# Patient Record
Sex: FEMALE | Race: WHITE | Employment: OTHER | RURAL
[De-identification: names, ages, dates, MRNs, and addresses within clinical notes are randomized per-mention and may not be internally consistent; named-entity substitution may affect disease eponyms.]

---

## 2018-09-07 PROBLEM — F33.2 MAJOR DEPRESSIVE DISORDER, RECURRENT EPISODE, SEVERE (HCC): Status: ACTIVE | Noted: 2018-09-07

## 2018-09-07 PROBLEM — F33.3 DEPRESSION, MAJOR, RECURRENT, SEVERE WITH PSYCHOSIS (HCC): Status: ACTIVE | Noted: 2018-09-07

## 2018-09-07 PROBLEM — F33.3 MAJOR DEPRESSIVE DISORDER, RECURRENT EPISODE WITH MOOD-CONGRUENT PSYCHOTIC FEATURES (HCC): Status: ACTIVE | Noted: 2018-09-07

## 2018-11-21 PROBLEM — E78.5 HYPERLIPIDEMIA: Chronic | Status: ACTIVE | Noted: 2018-11-21

## 2018-11-21 PROBLEM — E11.9 DIABETES MELLITUS TYPE 2, DIET-CONTROLLED (HCC): Chronic | Status: ACTIVE | Noted: 2018-11-21

## 2018-11-21 PROBLEM — I44.7 COMPLETE LEFT BUNDLE BRANCH BLOCK (LBBB): Chronic | Status: ACTIVE | Noted: 2018-11-21

## 2018-11-21 PROBLEM — C50.411 MALIGNANT NEOPLASM OF UPPER-OUTER QUADRANT OF RIGHT BREAST IN FEMALE, ESTROGEN RECEPTOR NEGATIVE (HCC): Status: ACTIVE | Noted: 2018-11-21

## 2018-11-21 PROBLEM — G20 PARKINSON DISEASE (HCC): Chronic | Status: ACTIVE | Noted: 2018-11-21

## 2018-11-21 PROBLEM — Z17.1 MALIGNANT NEOPLASM OF UPPER-OUTER QUADRANT OF RIGHT BREAST IN FEMALE, ESTROGEN RECEPTOR NEGATIVE (HCC): Status: ACTIVE | Noted: 2018-11-21

## 2018-11-21 PROBLEM — Z86.73 HISTORY OF TIA (TRANSIENT ISCHEMIC ATTACK): Chronic | Status: ACTIVE | Noted: 2018-11-21

## 2018-11-21 PROBLEM — I42.8 NON-ISCHEMIC CARDIOMYOPATHY (HCC): Chronic | Status: ACTIVE | Noted: 2018-11-21

## 2018-11-21 PROBLEM — I10 HTN (HYPERTENSION): Chronic | Status: ACTIVE | Noted: 2018-11-21

## 2018-11-21 PROBLEM — F32.A DEPRESSION: Status: ACTIVE | Noted: 2018-11-21

## 2018-11-21 PROBLEM — I48.0 PAF (PAROXYSMAL ATRIAL FIBRILLATION) (HCC): Chronic | Status: ACTIVE | Noted: 2018-11-21

## 2018-11-25 PROBLEM — D64.9 SYMPTOMATIC ANEMIA: Status: ACTIVE | Noted: 2018-11-25

## 2018-11-29 PROBLEM — F33.3 MAJOR DEPRESSIVE DISORDER, RECURRENT EPISODE, SEVERE, WITH PSYCHOSIS (HCC): Status: ACTIVE | Noted: 2018-11-29

## 2018-12-06 ENCOUNTER — TELEPHONE (OUTPATIENT)
Dept: ONCOLOGY | Age: 78
End: 2018-12-06

## 2018-12-06 NOTE — TELEPHONE ENCOUNTER
I spoke to Griselda Acuna, the patient's daughter-in-law. The patient had told her that her condition is terminal. That is not correct. So far the patient has had a successful surgery and may be able to receive adjuvant chemotherapy per a decision by a new Oncologist close to her home. It would be that Oncologist's decision as to whether she is well enough to treat at the time she is cared for safely at home. I did note that her CT scans of of chest, abdomen and pelvis and bone scan were all negative for overt mets. The only issue is that the CT of the brain was done without contrast. That might be an important issue to do at the time she is in [facility] and ready for decision as to recommend adjuvant treatment or not. I mentioned the need for medical POA to consider to consent to treatment if offered. Ms Griselda Acuna indicated that she understood the issues here.